# Patient Record
Sex: MALE | Race: WHITE
[De-identification: names, ages, dates, MRNs, and addresses within clinical notes are randomized per-mention and may not be internally consistent; named-entity substitution may affect disease eponyms.]

---

## 2019-11-18 ENCOUNTER — HOSPITAL ENCOUNTER (OUTPATIENT)
Dept: HOSPITAL 46 - OPS | Age: 69
Discharge: HOME | End: 2019-11-18
Attending: SURGERY
Payer: COMMERCIAL

## 2019-11-18 VITALS — BODY MASS INDEX: 27.97 KG/M2 | WEIGHT: 174 LBS | HEIGHT: 66 IN

## 2019-11-18 DIAGNOSIS — K57.30: ICD-10-CM

## 2019-11-18 DIAGNOSIS — Z12.11: Primary | ICD-10-CM

## 2019-11-18 DIAGNOSIS — I11.9: ICD-10-CM

## 2019-11-18 DIAGNOSIS — Z98.890: ICD-10-CM

## 2019-11-18 DIAGNOSIS — K64.8: ICD-10-CM

## 2019-11-18 DIAGNOSIS — K21.9: ICD-10-CM

## 2019-11-18 PROCEDURE — G0500 MOD SEDAT ENDO SERVICE >5YRS: HCPCS

## 2019-11-18 PROCEDURE — 0DJD8ZZ INSPECTION OF LOWER INTESTINAL TRACT, VIA NATURAL OR ARTIFICIAL OPENING ENDOSCOPIC: ICD-10-PCS | Performed by: SURGERY

## 2019-11-18 NOTE — NUR
11/18/19 1533 Payal Garcia
1528- PT ARRIVES TO PACU EASILY AROUSABLE TO VOICE. PT REPORTS NO PAIN
OR NAUSEA AND FALLS TO SLEEP. RESP EVEN AND UNLABORED. OXYGEN SAT HIGH
90'S ON 2L VIA NC.
1532- OXYGEN TITRATED OFF.
1533- PT PASSING FLATUS.

## 2019-11-19 NOTE — OR
[facility]
                                    [address], [place]  [address]
_________________________________________________________________________________________
                                                                 Signed   
 
 
DATE OF OPERATION:
11/18/2019
 
SURGEON:
Michael Correa MD
 
PREOPERATIVE DIAGNOSIS:
Surveillance colonoscopy.
 
POSTOPERATIVE DIAGNOSES:
1. Diverticulosis of sigmoid and left colon.
2. Internal hemorrhoids.
 
PROCEDURE PERFORMED:
Total colonoscopy to cecum.
 
ANESTHESIA:
Intravenous sedation, fentanyl 100 mcg and versed 5 mg.
 
INDICATION:
This 69-year-old white man is a patient of Dr. Parry, is referred for colonoscopy for
surveillance.  He last underwent colonoscopy 10 years ago in 2010, where he was found to
have internal hemorrhoids.  There were no other findings of concern.  He is symptom
free, having no bleeding, diarrhea, or constipation.  He has no family history of colon
cancer.  He understands the risks of bleeding, infection, and perforation related to
colonoscopy and wished to proceed. 
 
FINDINGS:
The prep was excellent.  Complete colonoscopy was undertaken to cecum without problem.
He had numerous diverticula of the sigmoid and left colon, but no sign of polyps or
colitis.  Internal hemorrhoidal changes were noted as well. 
 
DESCRIPTION OF PROCEDURE:
The patient was brought to the endoscopy suite and placed in lateral decubitus position,
given intravenous sedation to the point of slurred speech and nystagmus.  Digital rectal
examination showed no abnormality.  An Olympus video colonoscope was passed in the
rectum and manipulated throughout the colon ultimately intubating the cecum itself.
Numerous diverticula were seen in the sigmoid and left colon.  The scope was withdrawn
from the cecum and examination throughout showed no sign of polyps or colitis, only
diverticular changes on the left side as described.  Retroflexed view of the rectum did
confirm some internal hemorrhoidal changes but no acute problem currently.  Scope was
straightened, withdrawn, and removed and the patient was taken to recovery room in good
 
    Electronically Signed By: MICHAEL CORREA MD  11/19/19 9088
_________________________________________________________________________________________
PATIENT NAME:     ALEX COLEMAN              
MEDICAL RECORD #: [id number]            OPERATIVE REPORT              
          ACCT #: [id number]  
DATE OF BIRTH:   [birth year]            REPORT #: [id number]      
PHYSICIAN:        MICHAEL CORREA MD                 
PCP:              CINDY PARRY MD      
REPORT IS CONFIDENTIAL AND NOT TO BE RELEASED WITHOUT AUTHORIZATION
 
 
                                  [facility]
                                    [address]
                                  [place], [place]  [address]
_________________________________________________________________________________________
                                                                 Signed   
 
 
condition. 
 
CONCLUDING DIAGNOSIS:
Diverticular changes of sigmoid and internal hemorrhoids.  No other abnormalities.
 
PLAN:
We would recommend repeat colonoscopy in 10 years, sooner if clinically indicated.
Recommend also high-fiber diet or Citrucel one scoop p.o. daily. 
 
 
 
            ________________________________________
            MD KATHERIN Gomez/ASAFL
Job #:  [id number]
DD:  11/18/2019 15:35:08
DT:  11/18/2019 21:34:24
 
cc:            Dr. Parry
 
 
Copies:                                
~
 
 
 
 
 
 
 
 
 
 
 
 
 
 
 
 
 
 
    Electronically Signed By: MICHAEL CORREA MD  11/19/19 2202
_________________________________________________________________________________________
PATIENT NAME:     ALEX COLEMAN              
MEDICAL RECORD #: [id number]            OPERATIVE REPORT              
          ACCT #: [id number]  
DATE OF BIRTH:   [birth year]            REPORT #: [id number]      
PHYSICIAN:        MICHAEL CORREA MD                 
PCP:              CINDY PARRY MD      
REPORT IS CONFIDENTIAL AND NOT TO BE RELEASED WITHOUT AUTHORIZATION

## 2023-07-12 ENCOUNTER — HOSPITAL ENCOUNTER (EMERGENCY)
Dept: HOSPITAL 46 - ED | Age: 73
Discharge: HOME | End: 2023-07-12
Payer: MEDICARE

## 2023-07-12 VITALS — BODY MASS INDEX: 27.96 KG/M2 | WEIGHT: 173.99 LBS | HEIGHT: 66 IN

## 2023-07-12 VITALS — DIASTOLIC BLOOD PRESSURE: 84 MMHG | SYSTOLIC BLOOD PRESSURE: 146 MMHG

## 2023-07-12 DIAGNOSIS — I10: ICD-10-CM

## 2023-07-12 DIAGNOSIS — Z79.899: ICD-10-CM

## 2023-07-12 DIAGNOSIS — R10.9: Primary | ICD-10-CM

## 2023-07-12 DIAGNOSIS — K80.20: ICD-10-CM

## 2023-07-13 NOTE — EKG
[facility]
                                    [address]
                                  [place], [place]  [address]
_________________________________________________________________________________________
                                                                 Signed   
 
 
Normal sinus rhythm
Normal ECG
No previous ECGs available
Confirmed by EVANGELINA SIDDIQUI MD ([id number]) on 7/13/2023 4:46:46 PM
 
 
 
 
 
 
 
 
 
 
 
 
 
 
 
 
 
 
 
 
 
 
 
 
 
 
 
 
 
 
 
 
 
 
 
 
 
 
 
 
 
    Electronically Signed By: EVANGELINA SIDDIQUI  07/13/23 1647
_________________________________________________________________________________________
PATIENT NAME:     ALEX COLEMAN              
MEDICAL RECORD #: [id number]                     Electrocardiogram             
          ACCT #: [id number]  
DATE OF BIRTH:   [birth year]                                       
PHYSICIAN:   EVANGELINA SIDDIQUI                       REPORT #: [id number]
REPORT IS CONFIDENTIAL AND NOT TO BE RELEASED WITHOUT AUTHORIZATION

## 2024-10-31 ENCOUNTER — HOSPITAL ENCOUNTER (EMERGENCY)
Dept: HOSPITAL 46 - ED | Age: 74
Discharge: HOME | End: 2024-10-31
Payer: MEDICARE

## 2024-10-31 VITALS — SYSTOLIC BLOOD PRESSURE: 139 MMHG | DIASTOLIC BLOOD PRESSURE: 81 MMHG

## 2024-10-31 VITALS — HEIGHT: 66 IN | BODY MASS INDEX: 27.07 KG/M2 | WEIGHT: 168.43 LBS

## 2024-10-31 DIAGNOSIS — A08.4: Primary | ICD-10-CM

## 2024-10-31 DIAGNOSIS — I10: ICD-10-CM

## 2024-10-31 DIAGNOSIS — Z79.899: ICD-10-CM

## 2024-10-31 LAB
ALBUMIN SERPL-MCNC: 4 G/DL (ref 3.4–5)
ALBUMIN/GLOB SERPL: 1.05 {RATIO} (ref 1.1–2.4)
ALP SERPL-CCNC: 109 U/L (ref 46–116)
ALT SERPL W P-5'-P-CCNC: 23 U/L (ref 14–59)
ANION GAP SERPL CALCULATED.4IONS-SCNC: 17 MMOL/L (ref 7–21)
AST SERPL-CCNC: 19 U/L (ref 15–37)
BASOPHILS NFR BLD AUTO: 1.5 % (ref 0–2)
BUN SERPL-MCNC: 24 MG/DL (ref 7–18)
BUN/CREAT SERPL: 18.46 (ref 6–28.6)
CALCIUM SERPL-MCNC: 9.2 MG/DL (ref 8.5–10.1)
CHLORIDE SERPL-SCNC: 102 MMOL/L (ref 98–107)
CO2 SERPL-SCNC: 23 MMOL/L (ref 21–32)
DEPRECATED RDW RBC AUTO: 13.6 FL (ref 10.5–15)
EGFRCR SERPLBLD CKD-EPI 2021: 58 ML/MIN (ref 60–?)
EOSINOPHIL NFR BLD AUTO: 6.5 % (ref 0–6)
FLUBV RNA RESP QL NAA+PROBE: NEGATIVE
GLOBULIN SER-MCNC: 3.8 G/DL (ref 1.8–3.5)
HCT VFR BLD AUTO: 46.5 % (ref 35–50)
HGB BLD-MCNC: 15.7 G/DL (ref 12–18)
HGB UR QL STRIP: NEGATIVE
KETONES UR QL STRIP: NEGATIVE
LEUKOCYTE ESTERASE UR QL STRIP: NEGATIVE
LYMPHOCYTES NFR BLD AUTO: 30.7 % (ref 24–44)
MAGNESIUM SERPL-MCNC: 2.1 MG/DL (ref 1.8–2.4)
MCH RBC QN AUTO: 30.6 PG (ref 27–36)
MCHC RBC AUTO-ENTMCNC: 33.7 G/DL (ref 30–36)
MCV RBC AUTO: 90.5 FL (ref 81–99)
MONOCYTES NFR BLD AUTO: 9.9 % (ref 0–12)
NEUTROPHILS NFR BLD AUTO: 51.4 % (ref 39–80)
NITRITE UR QL STRIP: NEGATIVE
PLATELET # BLD AUTO: 240 K/UL (ref 140–440)
POTASSIUM SERPL-SCNC: 4 MMOL/L (ref 3.5–5.1)
PROT SERPL-MCNC: 7.8 G/DL (ref 6.4–8.2)
RBC # BLD AUTO: 5.13 M/UL (ref 4.3–5.7)
RSV RNA ISLT QL NAA+PROBE: NEGATIVE

## 2024-10-31 PROCEDURE — U0002 COVID-19 LAB TEST NON-CDC: HCPCS

## 2025-01-20 VITALS — SYSTOLIC BLOOD PRESSURE: 128 MMHG | DIASTOLIC BLOOD PRESSURE: 98 MMHG

## 2025-01-23 ENCOUNTER — HOSPITAL ENCOUNTER (OUTPATIENT)
Dept: HOSPITAL 46 - DS | Age: 75
Discharge: HOME | End: 2025-01-23
Attending: SURGERY
Payer: MEDICARE

## 2025-01-23 VITALS — DIASTOLIC BLOOD PRESSURE: 69 MMHG | SYSTOLIC BLOOD PRESSURE: 122 MMHG

## 2025-01-23 VITALS — HEIGHT: 66 IN | BODY MASS INDEX: 27.38 KG/M2 | WEIGHT: 170.35 LBS

## 2025-01-23 VITALS — DIASTOLIC BLOOD PRESSURE: 83 MMHG | SYSTOLIC BLOOD PRESSURE: 141 MMHG

## 2025-01-23 VITALS — DIASTOLIC BLOOD PRESSURE: 95 MMHG | SYSTOLIC BLOOD PRESSURE: 129 MMHG

## 2025-01-23 VITALS — SYSTOLIC BLOOD PRESSURE: 123 MMHG | DIASTOLIC BLOOD PRESSURE: 65 MMHG

## 2025-01-23 VITALS — DIASTOLIC BLOOD PRESSURE: 82 MMHG | SYSTOLIC BLOOD PRESSURE: 142 MMHG

## 2025-01-23 VITALS — SYSTOLIC BLOOD PRESSURE: 139 MMHG | DIASTOLIC BLOOD PRESSURE: 80 MMHG

## 2025-01-23 DIAGNOSIS — K80.10: Primary | ICD-10-CM

## 2025-01-23 DIAGNOSIS — Z79.899: ICD-10-CM

## 2025-01-23 DIAGNOSIS — I10: ICD-10-CM

## 2025-01-23 DIAGNOSIS — K82.8: ICD-10-CM

## 2025-01-23 DIAGNOSIS — K21.9: ICD-10-CM

## 2025-01-23 PROCEDURE — BF13YZZ FLUOROSCOPY OF GALLBLADDER AND BILE DUCTS USING OTHER CONTRAST: ICD-10-PCS | Performed by: SURGERY

## 2025-01-23 PROCEDURE — 0FT44ZZ RESECTION OF GALLBLADDER, PERCUTANEOUS ENDOSCOPIC APPROACH: ICD-10-PCS | Performed by: SURGERY

## 2025-01-23 NOTE — NUR
1125: PATIENT BACK IN DAY SURGERY ROOM FROM PACU. RATES PAIN 4/10 IN ABDOMEN.
ABDOMEN WITH LAP SITES X 5. ALL WITH STERI STRIPS AND SCANT AMOUNT OF RED
DRAINAGE. IV SITE WNL. SCDs ON. TOLERATING WATER. GIVEN APPLESAUCE AND APPLE
JUICE. VS CHECKED. PATIENT DROWSY. WIFE AT BEDSIDE. CALL LIGHT WITHIN REACH.

## 2025-01-23 NOTE — NUR
01/23/25 1048 Ariella Cloud
1037-PT ARRIVES TO PACU VIA STRETCHER RESTING SEMI FOWLERS, PT NOT
RESPONSIVE TO NOXIOUS STIMULI, VSS ON 10L VIA MASK AND OPA IN PLACE,
PT REQUIRING MANUAL JAW THRUST. RR EVEN AND UNLABORED.

## 2025-01-23 NOTE — EKG
[facility]
                                    [address]
                                  [place]  [address]
_________________________________________________________________________________________
                                                                 Signed   
 
 
Normal sinus rhythm
Normal ECG
When compared with ECG of 12-JUL-2023 04:00,
No significant change was found
Confirmed by Stacie Atkins MD ([id number]) on 1/23/2025 10:22:16 PM
 
 
 
 
 
 
 
 
 
 
 
 
 
 
 
 
 
 
 
 
 
 
 
 
 
 
 
 
 
 
 
 
 
 
 
 
 
 
 
 
    Electronically Signed By: STACIE ATKINS MD  01/23/25 2222
_________________________________________________________________________________________
PATIENT NAME:     ALEX COLEMAN              
MEDICAL RECORD #: [id number]                     Electrocardiogram             
          ACCT #: [id number]  
DATE OF BIRTH:   [birth year]                                       
PHYSICIAN:   STACIE ATKINS MD                 REPORT #: [id number]
REPORT IS CONFIDENTIAL AND NOT TO BE RELEASED WITHOUT AUTHORIZATION

## 2025-01-23 NOTE — NUR
1305: CHECKED PATIENT. PATIENT SLEEPING.
1325: VS CHECKED. IV SITE WNL. SCDs ON. RATES PAIN 1/10. WIFE AT BEDSIDE. CALL
LIGHT WITHIN REACH.
1345: DISCHARGE INSTRUCTIONS GIVEN TO PATIENT AND WIFE. PATIENT ASSISTED TO
SIT ON SIDE OF BED. C/O LIGHT HEADEDNESS. PATIENT ALLOWED TO SIT ON SIDE OF
BED FOR ADDITIONAL TIME. PATIENT THEN STOOD UP AT SIDE OF BED WITH RN ASSIST.
PATIENT BECAME TO LIGHTHEADED TO WALK TO BATHROOM. PATIENT ASSISTED BACK TO
BED. WILL REST IN BED BEFORE GETTING UP TO BATHROOM.
1437: PATIENT AWAKENED FROM SLEEPING. STATES FEELING LESS DIZZY. WILL TAKE A
LITTLE MORE TIME BEFORE GETTING UP TO BATHROOM.
1500: PATIENT ASSISTED TO SIT AT SIDE OF BED. PATIENT STATES FEELS LIGHT
LIGHTHEADEDNESS THAN LAST TIME. PATIENT THEN STOOD AT BEDSIDE. THEN ASSISTED
TO WALK TO BATHROOM BY THIS RN AND WIFE. GAIT STEADY.
1510: STAND BY ASSIST WHILE WALKING FROM BATHROOM BACK TO ROOM. VOID WITHOUT
DIFFICULTY. PATIENT SITTING ON SIDE OF BED.
1520: VS CHECKED. PATIENT STATES READY TO GO HOME. IV DC'D WNL. TIP INTACT.
DRESSING APPLIED. DAUGHTER AND GRANDKIDS IN TO VISIT PATIENT.
1533: PATIENT DISCHARGED TO HOME VIA WHEELCHAIR WITH WIFE.

## 2025-01-23 NOTE — OR
[facility]
                                    [address], [place]  [address]
_________________________________________________________________________________________
                                                                 Signed   
 
 
DATE OF OPERATION:
01/23/2025
 
SURGEON:
Michael Correa MD
 
PREOPERATIVE DIAGNOSIS:
Chronic calculous cholecystitis.
 
POSTOPERATIVE DIAGNOSES:
1. Chronic calculous cholecystitis.
2. Gallbladder adhesions.
 
PROCEDURES:
1. Laparoscopic cholecystectomy with intraoperative cholangiogram, prolonged,
complicated, and difficult. 
2. Surgeon-directed fluoroscopy.
 
ANESTHESIA:
General endotracheal; Michael Gomez CRNA and local 10 mL of 0.25% Marcaine with
epinephrine. 
 
INDICATIONS FOR THE PROCEDURE:
This 74-year-old white man is a patient of Dr. Cindy Parry and has had episodic
right upper abdominal pain.  He presented to the emergency room, where he was evaluated
for this.  A CT scan was performed, which was negative.  The report from November 14,
2024, reviewed, showed no evidence of acute cholecystitis, though he certainly had
symptoms typical of biliary disease.  He ultimately underwent a CCK-HIDA test ordered by
Dr. Parry, which showed no filling of the gallbladder most consistent with acute
cholecystitis.  Recently, his symptoms have much diminished; normally he has right upper
abdominal pain and epigastric pain following oral intake.  His symptoms are typical of
biliary disease.  To my knowledge, he never had a gallbladder ultrasound.  With a HIDA
scan showing no filling of the gallbladder itself, he is highly likely to have
cholecystitis with partial or complete occlusion of the cystic duct.  I have recommended
cholecystectomy and he understands the risk of bleeding, infection, bile duct injury,
need for open surgery, and other unforeseen complications.  Understanding this, he
wished to proceed. 
 
FINDINGS:
Indeed the gallbladder was quite chronically inflamed and very thickened.  Dense omental
adhesions were noted to the gallbladder as well.  The liver itself was normal.
Cholangiogram was normal.  The gallbladder once opened, had multiple large and small
 
    Electronically Signed By: MICHAEL CORREA MD  01/23/25 1422
_________________________________________________________________________________________
PATIENT NAME:     ALEX COLEMAN              
MEDICAL RECORD #: [id number]            OPERATIVE REPORT              
          ACCT #: [id number]  
DATE OF BIRTH:   [birth year]            REPORT #: [id number]      
PHYSICIAN:        MICHAEL CORREA MD                 
PCP:              CINDY PARRY MD      
REPORT IS CONFIDENTIAL AND NOT TO BE RELEASED WITHOUT AUTHORIZATION
 
 
                                  [facility]
                                    [address], [place]  [address]
_________________________________________________________________________________________
                                                                 Signed   
 
 
gallstones and chronic inflammatory change in mucosa, but no evidence of neoplasm
proper. 
 
DESCRIPTION OF PROCEDURE:
The patient was brought to the operating room, given a general endotracheal anesthetic.
Preoperative antibiotic Ancef was given.  Sequential compression device stockings used
and heparin subcutaneously administered.  The abdomen was clipped and prepared with
chlorhexidine solution and draped sterilely.  There was redundant umbilical skin, which
was not associated with any discernible hernia proper. 
 
An infraumbilical incision was made using an open Trina cannula technique.
Pneumoperitoneum was achieved to a level of 14 mmHg of carbon dioxide gas.
Intra-abdominal inspection showed no sign of ascites or carcinomatosis.  The liver was
normal.  The gallbladder was obscured from view initially.  Three additional trocars
were placed in usual configuration in the subxiphoid, right midclavicular, and right
anterior axillary line.  The gallbladder was  from adhesions with blunt
electrocautery dissection.  The gallbladder could not be grasped due to thickening of
the gallbladder and likely large stones.  On that basis, decompression was undertaken
with a needle device draining some turbid greenish bile.  The gallbladder was able to be
grasped and elevated and the infundibulum was noted to have a fair amount of fatty
tissue.  This was  with blunt electrocautery dissection, ultimately identifying
well with cystic duct.  Hand retractor was placed through a separate 5 mm port to assist
in visualization of the infundibulum.  Ultimately, the cystic duct was dissected free
quite completely and a clip applied across gallbladder at cystic duct junction.  There
was a large stone wedged in the infundibulum, it was noted.  A transverse choledochotomy
in the cystic duct allowed for egress of clear bile.  An Chaves type cholangiocatheter
was inserted into the cystic duct.  Intraoperative cholangiography undertaken with free
flow of contrast through the biliary tree noted.  There was no sign of filling defect in
the common bile duct nor any biliary anomaly.  The catheter was removed and the cystic
duct was then triply clipped and divided.  The gallbladder was dissected free in a
retrograde fashion using electrocautery.  Clips were applied to cystic arterial
branches.  There were noted to be within a dense fibrous posterior wall of the
gallbladder.  The gallbladder was ultimately excised fully, though entry to liver
parenchyma did occur due to the difficulty of dissection of the densely and chronically
inflamed gallbladder.  The gallbladder was placed in an Endobag and extracted through
the infraumbilical port.  Note was made of spillage of one stone during the course of
dissection and this stone was retrieved as well.  There were no stones remaining.
Irrigation was undertaken in subhepatic space.  Electrocautery was used to secure
hemostasis more fully.  Given the extent of dissection and so forth, Earl hemostatic
agent was applied to the raw surface of the gallbladder fossa and excess irrigation
fluid suctioned free. 
 
 
    Electronically Signed By: MICHAEL CORREA MD  01/23/25 1422
_________________________________________________________________________________________
PATIENT NAME:     ALEX COLEMAN              
MEDICAL RECORD #: [id number]            OPERATIVE REPORT              
          ACCT #: [id number]  
DATE OF BIRTH:   [birth year]            REPORT #: [id number]      
PHYSICIAN:        MICHAEL CORREA MD                 
PCP:              CINDY PARRY MD      
REPORT IS CONFIDENTIAL AND NOT TO BE RELEASED WITHOUT AUTHORIZATION
 
 
                                  [facility]
                                    [address] [place]  [address]
_________________________________________________________________________________________
                                                                 Signed   
 
 
The trocars were removed under direct visualization showing no sign of bleeding.  The
infraumbilical fascial incision was reapproximated with interrupted 0-Vicryl suture.  A
10 mL of 0.25% Marcaine with epinephrine was injected locally.  The skin was closed with
interrupted 2-0 Vicryl and Steri-Strips were applied.  He was ultimately extubated and
transferred to the recovery room in good condition, having suffered no complications.
Sponge, needle, and instrument counts reported as correct x3. 
 
 
 
            ________________________________________
            MD KATHERIN Gomez/MINERVA
Job #:  [id number]
DD:  01/23/2025 10:47:29
DT:  01/23/2025 11:12:42
 
cc:            Cindy Parry MD
 
 
Copies:  CINDY PARRY MD
~
 
 
 
 
 
 
 
 
 
 
 
 
 
 
 
 
 
 
 
 
    Electronically Signed By: MICHAEL CORREA MD  01/23/25 1422
_________________________________________________________________________________________
PATIENT NAME:     ALEX COLEMAN              
MEDICAL RECORD #: [id number]            OPERATIVE REPORT              
          ACCT #: [id number]  
DATE OF BIRTH:   [birth year]            REPORT #: [id number]      
PHYSICIAN:        MICHAEL CORREA MD                 
PCP:              CINDY PARRY MD      
REPORT IS CONFIDENTIAL AND NOT TO BE RELEASED WITHOUT AUTHORIZATION

## 2025-01-27 NOTE — PATH
[facility]
                                    [address], [place]  [address]
_________________________________________________________________________________________
                                                                 Signed   
 
 
 
SPECIMEN(S): A GALLBLADDER AND STONES
 
SPECIMEN SOURCE:
A. GALLBLADDER AND STONES
 
CLINICAL HISTORY:
Chronic cholecystitis with calculus
 
FINAL PATHOLOGIC DIAGNOSIS:
Gallbladder and stones:
-  Chronic calculous cholecystitis.
JVR:clv
 
MICROSCOPIC EXAMINATION:
Histologic sections of all submitted blocks are examined by light microscopy.  
These findings, together with the gross examination, support the pathologic 
diagnosis. 
 
GROSS DESCRIPTION:
The specimen, labeled and designated "Roberto Carlos, S, " and designated on the 
requisition "gallbladder with stones," is received in formalin and consists of 
Specimen: Previously opened gallbladder.
Dimensions: 7.4 x 3.8 x 1.2 cm.
Serosa: Violaceous and focally congested with adherent adipose tissue.
Cystic Duct: Inked, patency cannot be grossly assessed.
Calculi: 4.0 x 3.8 x 1.4 cm aggregate of a green-black smooth, angular calculi.
Mucosa: Pink-red trabeculated with areas of erosion.
Wall thickness: 0.4 cm.
Lymph node: No pericystic lymph nodes are grossly identified.
Additional: None.
Representative sections are submitted in (A1-A2).
FB  (under the direct supervision of a pathologist)
The Gross Description was prepared using a voice recognition system. The report 
was reviewed for accuracy; however, sound-alike word errors, addition and/or 
deletions may occur. If there is any 
question about this report, please contact Client Services.
 
PERFORMING LABORATORY:
Technical component was performed by [facility], [address] [place] [id number] (CLIA# [id number]). Professional interpretation was 
performed by [facility] - [place], [address] 
 
                                                                                    
_________________________________________________________________________________________
PATIENT NAME:     ALEX COLEMAN              
MEDICAL RECORD #: [id number]            PATHOLOGY                     
          ACCT #: [id number]       ACCESSION #: [id number]     
DATE OF BIRTH:   [birth year]            REPORT #: [id number]       
PHYSICIAN:        [facility]              
PCP:              CINDY STREETER MD      
REPORT IS CONFIDENTIAL AND NOT TO BE RELEASED WITHOUT AUTHORIZATION
 
 
                                  [facility]
                                    [address] [place], [place]  [address]
_________________________________________________________________________________________
                                                                 Signed   
 
 
[address], [place] [address] (CLIA#: [id number]).
 
Diagnostician:  Nagi Tilley MD
Pathologist
Electronically Signed 01/27/2025
 
 
Copies:                                
~
 
 
 
 
 
 
 
 
 
 
 
 
 
 
 
 
 
 
 
 
 
 
 
 
 
 
 
 
 
 
 
 
 
 
                                                                                    
_________________________________________________________________________________________
PATIENT NAME:     ALEX COLEMAN              
MEDICAL RECORD #: [id number]            PATHOLOGY                     
          ACCT #: [id number]       ACCESSION #: [id number]     
DATE OF BIRTH:   [birth year]            REPORT #: [id number]       
PHYSICIAN:        [facility]              
PCP:              CINDY STREETER MD      
REPORT IS CONFIDENTIAL AND NOT TO BE RELEASED WITHOUT AUTHORIZATION